# Patient Record
Sex: FEMALE | Race: WHITE | Employment: FULL TIME | URBAN - NONMETROPOLITAN AREA
[De-identification: names, ages, dates, MRNs, and addresses within clinical notes are randomized per-mention and may not be internally consistent; named-entity substitution may affect disease eponyms.]

---

## 2021-03-12 ENCOUNTER — OFFICE VISIT (OUTPATIENT)
Dept: FAMILY MEDICINE CLINIC | Age: 61
End: 2021-03-12
Payer: COMMERCIAL

## 2021-03-12 VITALS
SYSTOLIC BLOOD PRESSURE: 152 MMHG | DIASTOLIC BLOOD PRESSURE: 76 MMHG | BODY MASS INDEX: 38.61 KG/M2 | TEMPERATURE: 98.1 F | HEIGHT: 67 IN | OXYGEN SATURATION: 98 % | WEIGHT: 246 LBS | HEART RATE: 97 BPM

## 2021-03-12 DIAGNOSIS — Z13.220 SCREENING, LIPID: ICD-10-CM

## 2021-03-12 DIAGNOSIS — E03.9 ACQUIRED HYPOTHYROIDISM: ICD-10-CM

## 2021-03-12 PROCEDURE — 99203 OFFICE O/P NEW LOW 30 MIN: CPT | Performed by: FAMILY MEDICINE

## 2021-03-12 RX ORDER — LEVOTHYROXINE SODIUM 0.1 MG/1
100 TABLET ORAL DAILY
COMMUNITY
End: 2021-03-16 | Stop reason: SDUPTHER

## 2021-03-12 RX ORDER — MULTIVITAMIN/IRON/FOLIC ACID 18MG-0.4MG
TABLET ORAL DAILY
COMMUNITY

## 2021-03-12 SDOH — HEALTH STABILITY: MENTAL HEALTH: HOW MANY STANDARD DRINKS CONTAINING ALCOHOL DO YOU HAVE ON A TYPICAL DAY?: NOT ASKED

## 2021-03-12 SDOH — HEALTH STABILITY: MENTAL HEALTH: HOW OFTEN DO YOU HAVE A DRINK CONTAINING ALCOHOL?: NEVER

## 2021-03-12 ASSESSMENT — PATIENT HEALTH QUESTIONNAIRE - PHQ9
1. LITTLE INTEREST OR PLEASURE IN DOING THINGS: 0
2. FEELING DOWN, DEPRESSED OR HOPELESS: 0

## 2021-03-12 NOTE — PATIENT INSTRUCTIONS
Staff -- please repeat blood pressure for this patient before patient leaves the office. If blood pressure not <140/90, then please arrange follow up in 1 month with nurse visit. Thank you    Obtain records from Dr. Leoncio Tenorio family physician. Staff -- please help with Flaviat       Copied from [employer]  On 0/7/2875    Patient Education        Hypothyroidism: Care Instructions  Your Care Instructions     When you have hypothyroidism, your body doesn't make enough thyroid hormone. This hormone helps your body use energy. If your thyroid level is low, you may feel tired, be constipated, have an increase in your blood pressure, or have dry skin or memory problems. You may also get cold easily, even when it is warm. Women with low thyroid levels may have heavy menstrual periods. A blood test to find your thyroid-stimulating hormone (TSH) level is used to check for hypothyroidism. A high TSH level may mean that you have it. The treatment for hypothyroidism is thyroid hormone pills. You should start to feel better in 1 to 2 weeks. Most people need treatment for the rest of their lives. You will need regular visits with your doctor to make sure you are doing well and that you have the right dose of medicine. Follow-up care is a key part of your treatment and safety. Be sure to make and go to all appointments, and call your doctor if you are having problems. It's also a good idea to know your test results and keep a list of the medicines you take. How can you care for yourself at home? · Take your thyroid hormone medicine exactly as prescribed. Call your doctor if you think you are having a problem with your medicine. Most people do not have side effects if they take the right amount of medicine regularly. ? Take the medicine 30 minutes before breakfast, and do not take it with calcium, vitamins, or iron.   ? Do not take extra doses of your thyroid medicine. It will not help you get better any faster, and it may cause side effects. ? If you forget to take a dose, do NOT take a double dose of medicine. Take your usual dose the next day. · Tell your doctor about all prescription, herbal, or over-the-counter products you take. · Take care of yourself. Eat a healthy diet, get enough sleep, and get regular exercise. When should you call for help? Call 911 anytime you think you may need emergency care. For example, call if:    · You passed out (lost consciousness).     · You have severe trouble breathing.     · You have a very slow heartbeat (less than 60 beats a minute).     · You have a low body temperature (95°F or below). Call your doctor now or seek immediate medical care if:    · You feel tired, sluggish, or weak.     · You have trouble remembering things or concentrating.     · You do not begin to feel better 2 weeks after starting your medicine. Watch closely for changes in your health, and be sure to contact your doctor if you have any problems. Where can you learn more? Go to [email or web address] org and sign in to your [facility] account. Enter K642 in the [employer] box to learn more about \"Hypothyroidism: Care Instructions. \"     If you do not have an account, please click on the \"Sign Up Now\" link. Current as of: March 31, 2020               Content Version: 12.8  © 6143-5644 Healthwise, [facility]. Care instructions adapted under license by [facility] ([facility]). If you have questions about a medical condition or this instruction, always ask your healthcare professional. [place] [address] any warranty or liability for your use of this information.

## 2021-03-12 NOTE — PROGRESS NOTES
[address] [place] [address] [address], [address], [place]  Phone:  [phone]  [facility]:[phone]       Name: Jimi Zavala  : 1960    Chief Complaint   Patient presents with    New Patient       HPI:     Jimi Zavala is a 64 y.o. female who presents today for     HPI    New patient to our clinic. Changing due to Retiring doctor in [place]. Synthroid 55 mcg daily has been using to make dosing last. She was to take Synthroid 110 mcg but was not sure on why dosages needed to increase. Last thyroid check during August had blood work -- had good blood work including kidney but not sure of thyroid. Dosage increased but has been doing 1/2 tablet. Concern about kidneys due to mother having an renal illness that took her life. Caught late but mother did not go to doctor. Dental care -- bad teeth but has no dental insurance. Eye care -- with glasses with yearly check  Diet -- more fast food  Exercise -- doing less than used to  Has had weight gain in last year  Sleep --  Never been a good sleeper, 6 hours. Feels refreshed. Tolerating medications well     Believes much preventive care already done. We will need to obtain records. Active Ambulatory Problems     Diagnosis Date Noted    No Active Ambulatory Problems     Resolved Ambulatory Problems     Diagnosis Date Noted    No Resolved Ambulatory Problems     Past Medical History:   Diagnosis Date    Hypothyroidism        Past Medical History:   Diagnosis Date    Hypothyroidism        History reviewed. No pertinent surgical history.     Social History     Socioeconomic History    Marital status: Not on file     Spouse name: Not on file    Number of children: Not on file    Years of education: Not on file    Highest education level: Not on file   Occupational History    Not on file   Social Needs    Financial resource strain: Not on file    Food insecurity     Worry: Not on file     Inability: Not on file   [employer] needs Medical: Not on file     Non-medical: Not on file   Tobacco Use    Smoking status: Never Smoker    Smokeless tobacco: Never Used   Substance and Sexual Activity    Alcohol use: Never     Frequency: Never     Binge frequency: Never    Drug use: Never    Sexual activity: Not on file   Lifestyle    Physical activity     Days per week: Not on file     Minutes per session: Not on file    Stress: Not on file   Relationships    Social connections     Talks on phone: Not on file     Gets together: Not on file     Attends Spiritism service: Not on file     Active member of club or organization: Not on file     Attends meetings of clubs or organizations: Not on file     Relationship status: Not on file    Intimate partner violence     Fear of current or ex partner: Not on file     Emotionally abused: Not on file     Physically abused: Not on file     Forced sexual activity: Not on file   Other Topics Concern    Not on file   Social History Narrative    Not on file       Family History   Problem Relation Age of Onset    Kidney Disease Mother     Heart Disease Mother     Obesity Mother     Anemia Mother     Cancer Father     Diabetes Brother     Heart Disease Brother     Breast Cancer Paternal Grandmother     Prostate Cancer Paternal Grandfather            Current Outpatient Medications:     levothyroxine (SYNTHROID) 100 MCG tablet, Take 100 mcg by mouth Daily, Disp: , Rfl:     Multiple Vitamins-Minerals (CENTRUM ADULTS) TABS, Take by mouth daily, Disp: , Rfl:     Cholecalciferol (VITAMIN D3) 125 MCG (5000 UT) TABS, Take by mouth daily, Disp: , Rfl:     Allergies   Allergen Reactions    Penicillins Hives       Review of Systems   Constitutional: Negative for fatigue and fever. Respiratory: Negative for shortness of breath. Cardiovascular: Negative for chest pain and leg swelling. Psychiatric/Behavioral: Negative for behavioral problems. The patient is not nervous/anxious.         Objective: personalized tips given. Return in about 3 months (around 6/12/2021). No future appointments. [place]    This office note has been dictated. Effort was made to review for errors but some may have been missed. Please contact Mayo Veronica of note for clarification if needed.        Electronically signed by Negar Tillman MD on 3/12/2021 at 4:01 PM

## 2021-03-13 ENCOUNTER — HOSPITAL ENCOUNTER (OUTPATIENT)
Age: 61
Discharge: HOME | End: 2021-03-13
Payer: COMMERCIAL

## 2021-03-13 ENCOUNTER — HOSPITAL ENCOUNTER (OUTPATIENT)
Age: 61
End: 2021-03-13

## 2021-03-13 LAB
ALBUMIN SERPL-MCNC: 4.2 G/DL (ref 3.5–5.1)
ALP BLD-CCNC: 69 U/L (ref 38–126)
ALT SERPL-CCNC: 48 U/L (ref 11–66)
ANION GAP SERPL CALCULATED.3IONS-SCNC: 9 MEQ/L (ref 8–16)
AST SERPL-CCNC: 37 U/L (ref 5–40)
BILIRUB SERPL-MCNC: 0.6 MG/DL (ref 0.3–1.2)
BUN BLDV-MCNC: 11 MG/DL (ref 7–22)
CALCIUM SERPL-MCNC: 9 MG/DL (ref 8.5–10.5)
CHLORIDE BLD-SCNC: 100 MEQ/L (ref 98–111)
CHOLESTEROL, TOTAL: 193 MG/DL (ref 100–199)
CO2: 28 MEQ/L (ref 23–33)
CREAT SERPL-MCNC: 0.7 MG/DL (ref 0.4–1.2)
GFR SERPL CREATININE-BSD FRML MDRD: 85 ML/MIN/1.73M2
GLUCOSE BLD-MCNC: 89 MG/DL (ref 70–108)
HDLC SERPL-MCNC: 52 MG/DL
LDL CHOLESTEROL CALCULATED: 126 MG/DL
POTASSIUM SERPL-SCNC: 4.1 MEQ/L (ref 3.5–5.2)
SODIUM BLD-SCNC: 137 MEQ/L (ref 135–145)
T4 FREE: 1.07 NG/DL (ref 0.93–1.76)
TOTAL PROTEIN: 7.9 G/DL (ref 6.1–8)
TRIGL SERPL-MCNC: 75 MG/DL (ref 0–199)
TSH SERPL DL<=0.05 MIU/L-ACNC: 18.95 UIU/ML (ref 0.4–4.2)

## 2021-03-13 PROCEDURE — 80061 LIPID PANEL: CPT

## 2021-03-13 PROCEDURE — 84439 ASSAY OF FREE THYROXINE: CPT

## 2021-03-13 PROCEDURE — 84443 ASSAY THYROID STIM HORMONE: CPT

## 2021-03-13 PROCEDURE — 80053 COMPREHEN METABOLIC PANEL: CPT

## 2021-03-13 PROCEDURE — 36415 COLL VENOUS BLD VENIPUNCTURE: CPT

## 2021-03-14 PROBLEM — E03.9 ACQUIRED HYPOTHYROIDISM: Status: ACTIVE | Noted: 2021-03-14

## 2021-03-14 ASSESSMENT — ENCOUNTER SYMPTOMS: SHORTNESS OF BREATH: 0

## 2021-03-16 ENCOUNTER — TELEPHONE (OUTPATIENT)
Dept: FAMILY MEDICINE CLINIC | Age: 61
End: 2021-03-16

## 2021-03-16 DIAGNOSIS — E03.9 ACQUIRED HYPOTHYROIDISM: Primary | ICD-10-CM

## 2021-03-16 RX ORDER — LEVOTHYROXINE SODIUM 0.1 MG/1
100 TABLET ORAL DAILY
Qty: 90 TABLET | Refills: 3 | Status: SHIPPED | OUTPATIENT
Start: 2021-03-16 | End: 2022-03-07 | Stop reason: SDUPTHER

## 2021-03-16 NOTE — TELEPHONE ENCOUNTER
[facility] received a call from:    Name of Caller: Dominik Dolan    Relationship to patient: Self    Organization name: [facility] contact number: [phone]    Reason for call: Pt calling to ask if Dr. Damico Found has had a chance to look at her lab results from 3/13. Please call to discuss results and her prescription.

## 2021-03-17 NOTE — TELEPHONE ENCOUNTER
Labs are reviewed. My apologies for the delay. Thyroid is off as expected. She is to increase from her 1/2 tablet to full tablet and take such for next 3 months. Cholesterol, kidney and liver look pretty good. Just before next appt, another thyroid test should be done -- order placed.

## 2021-03-19 NOTE — TELEPHONE ENCOUNTER
Spoke with patient about her thyroid results. She has agreed to start taking a full tablet of synthroid every day. She has verbalized an understanding.

## 2021-06-30 ENCOUNTER — HOSPITAL ENCOUNTER (OUTPATIENT)
Age: 61
Discharge: HOME | End: 2021-06-30
Payer: COMMERCIAL

## 2021-06-30 DIAGNOSIS — E03.9 ACQUIRED HYPOTHYROIDISM: ICD-10-CM

## 2021-06-30 PROCEDURE — 84443 ASSAY THYROID STIM HORMONE: CPT

## 2021-06-30 PROCEDURE — 36415 COLL VENOUS BLD VENIPUNCTURE: CPT

## 2021-07-01 LAB — TSH SERPL DL<=0.05 MIU/L-ACNC: 3.01 UIU/ML (ref 0.4–4.2)

## 2021-07-09 ENCOUNTER — OFFICE VISIT (OUTPATIENT)
Dept: FAMILY MEDICINE CLINIC | Age: 61
End: 2021-07-09
Payer: COMMERCIAL

## 2021-07-09 VITALS
WEIGHT: 236 LBS | HEART RATE: 88 BPM | BODY MASS INDEX: 37.04 KG/M2 | HEIGHT: 67 IN | TEMPERATURE: 97.4 F | SYSTOLIC BLOOD PRESSURE: 136 MMHG | RESPIRATION RATE: 16 BRPM | DIASTOLIC BLOOD PRESSURE: 82 MMHG | OXYGEN SATURATION: 98 %

## 2021-07-09 DIAGNOSIS — Z00.00 WELLNESS EXAMINATION: ICD-10-CM

## 2021-07-09 DIAGNOSIS — Z12.11 SCREEN FOR COLON CANCER: ICD-10-CM

## 2021-07-09 DIAGNOSIS — E03.9 ACQUIRED HYPOTHYROIDISM: Primary | ICD-10-CM

## 2021-07-09 DIAGNOSIS — Z12.31 ENCOUNTER FOR SCREENING MAMMOGRAM FOR BREAST CANCER: ICD-10-CM

## 2021-07-09 DIAGNOSIS — E55.9 VITAMIN D DEFICIENCY: ICD-10-CM

## 2021-07-09 PROCEDURE — 99213 OFFICE O/P EST LOW 20 MIN: CPT | Performed by: FAMILY MEDICINE

## 2021-07-09 ASSESSMENT — ENCOUNTER SYMPTOMS: SHORTNESS OF BREATH: 0

## 2021-07-09 NOTE — PROGRESS NOTES
Connor Cox (:  1960) is a 64 y.o. female,Established patient, here for evaluation of the following chief complaint(s):  3 Month Follow-Up and Hypothyroidism       ASSESSMENT/PLAN:  1. Acquired hypothyroidism  2. Encounter for screening mammogram for breast cancer  -     JEANNE DIGITAL SCREEN W OR WO CAD BILATERAL; Future  3. Screen for colon cancer  -     POCT Fecal Immunochemical Test (FIT); Future  4. Wellness examination  -     Lipid Panel; Future  -     CBC Auto Differential; Future  -     Basic Metabolic Panel; Future  -     TSH With Reflex Ft4; Future  -     Vitamin D 25 Hydroxy; Future  5. Vitamin D deficiency  -     Vitamin D 25 Hydroxy; Future    Thyroid appears to be stable. Continue with current dose. Repeat testing will be done closer to the time of needing refill next year. Encourage continued healthier eating and staying active. Encourage coming up with a winter plan or bad weather plan to stay active. At this time encourage good iron sources along with green/fiber and vitamin C containing foods to help with iron absorption. He denies symptoms that are classic of anemia or iron deficiency. DHEA supplementation was of not significant improvement for her and that she does not need to restart that. We discussed sources of folic acid that she can add to her diet. Records request from Dr. Minoo Childs. Return in about 8 months (around 3/9/2022) for annual PE with pap and thyroid, blood work. Subjective   SUBJECTIVE/OBJECTIVE:  HPI     Thyroid issues -- stable, doing well. TSH much improved. Active walking dog now. Diet changes -- decreased fast food, 2 eggs in am.   Sleep is about 6 hours. Wonders about supplements that she took before. She had a  arthritic attack, moving arms and soreness in legs. Inflammation. Had trouble with doing computer work. Did steroid. Saw rheumatology. Placed on vitamin D3 1000, iron 65 BID, DHEA, folic acid.   She has maintained the vitamin D but nothing else. She does eat meat sources and a few vegetables. Some fruit as well. She did not see much difference coming off the DHEA or the other supplements. Itching off and on. Comes and goes, certain spots, where skin rubs, panty line. No rash. No hives. Colonoscopy Dr. Michelle Burciaga, in [facility] [phone]. Dr. Magaly Freire, [facility] -- cardiology, [facility] in the  Past.   Last pap about 3-5 years. Review of Systems   Constitutional: Negative for fatigue and fever. Respiratory: Negative for shortness of breath. Cardiovascular: Negative for chest pain and leg swelling. Objective   Physical Exam  Constitutional:       General: She is not in acute distress. Appearance: Normal appearance. She is not ill-appearing. Cardiovascular:      Rate and Rhythm: Normal rate and regular rhythm. Heart sounds: No murmur heard. Pulmonary:      Effort: Pulmonary effort is normal. No respiratory distress. Breath sounds: Normal breath sounds. No wheezing. Musculoskeletal:         General: No swelling. Neurological:      Mental Status: She is alert and oriented to person, place, and time. Psychiatric:         Mood and Affect: Mood normal.         Behavior: Behavior normal.       Wt Readings from Last 3 Encounters:   07/09/21 236 lb (107 kg)   03/12/21 246 lb (111.6 kg)     Hospital Outpatient Visit on 06/30/2021   Component Date Value Ref Range Status    TSH 06/30/2021 3.010  0.400 - 4.200 uIU/mL Final    Performed at [facility], [address]      An electronic signature was used to authenticate this note.     --Sheree Crawford MD

## 2022-03-07 ENCOUNTER — OFFICE VISIT (OUTPATIENT)
Dept: FAMILY MEDICINE CLINIC | Age: 62
End: 2022-03-07
Payer: COMMERCIAL

## 2022-03-07 VITALS
SYSTOLIC BLOOD PRESSURE: 130 MMHG | HEIGHT: 67 IN | BODY MASS INDEX: 37.98 KG/M2 | WEIGHT: 242 LBS | TEMPERATURE: 98.1 F | HEART RATE: 79 BPM | OXYGEN SATURATION: 98 % | DIASTOLIC BLOOD PRESSURE: 74 MMHG

## 2022-03-07 DIAGNOSIS — E03.9 ACQUIRED HYPOTHYROIDISM: ICD-10-CM

## 2022-03-07 DIAGNOSIS — Z01.419 WELL FEMALE EXAM WITH ROUTINE GYNECOLOGICAL EXAM: Primary | ICD-10-CM

## 2022-03-07 DIAGNOSIS — Z12.4 CERVICAL CANCER SCREENING: ICD-10-CM

## 2022-03-07 PROCEDURE — 99396 PREV VISIT EST AGE 40-64: CPT | Performed by: FAMILY MEDICINE

## 2022-03-07 RX ORDER — LEVOTHYROXINE SODIUM 0.1 MG/1
100 TABLET ORAL DAILY
Qty: 90 TABLET | Refills: 3 | Status: SHIPPED | OUTPATIENT
Start: 2022-03-07 | End: 2022-05-31 | Stop reason: SDUPTHER

## 2022-03-07 SDOH — ECONOMIC STABILITY: FOOD INSECURITY: WITHIN THE PAST 12 MONTHS, YOU WORRIED THAT YOUR FOOD WOULD RUN OUT BEFORE YOU GOT MONEY TO BUY MORE.: NEVER TRUE

## 2022-03-07 SDOH — ECONOMIC STABILITY: FOOD INSECURITY: WITHIN THE PAST 12 MONTHS, THE FOOD YOU BOUGHT JUST DIDN'T LAST AND YOU DIDN'T HAVE MONEY TO GET MORE.: NEVER TRUE

## 2022-03-07 ASSESSMENT — SOCIAL DETERMINANTS OF HEALTH (SDOH): HOW HARD IS IT FOR YOU TO PAY FOR THE VERY BASICS LIKE FOOD, HOUSING, MEDICAL CARE, AND HEATING?: NOT HARD AT ALL

## 2022-03-07 NOTE — PROGRESS NOTES
Well Adult Note  Name: Andrew Combs Date: 3/7/2022   MRN: [id number] Sex: Female   Age: 58 y.o. Ethnicity: Non- / Non    : 1960 Race: White (non-)      Phil Hdez is here for well adult exam.  History:    Chief Complaint   Patient presents with    Hypothyroidism     Spent time with dad in [facility], which was good. Will be more active soon. She's hoping to lose weight and focus on eating better. Otherwise doing well. Review of Systems   Constitutional: Negative for fatigue and fever. Respiratory: Negative for shortness of breath. Cardiovascular: Negative for chest pain and leg swelling. Allergies   Allergen Reactions    Penicillins Hives         Prior to Visit Medications    Medication Sig Taking? Authorizing Provider   levothyroxine (SYNTHROID) 100 MCG tablet Take 1 tablet by mouth Daily Yes Mariama Acosta MD   Multiple Vitamins-Minerals (CENTRUM ADULTS) TABS Take by mouth daily Yes Historical Provider, MD   Cholecalciferol (VITAMIN D3) 125 MCG (5000 UT) TABS Take by mouth daily  Historical Provider, MD         Past Medical History:   Diagnosis Date    Hypothyroidism        History reviewed. No pertinent surgical history.       Family History   Problem Relation Age of Onset    Kidney Disease Mother     Heart Disease Mother     Obesity Mother    Rosalenmickie Anna Anemia Mother     Cancer Father     Diabetes Brother     Heart Disease Brother     Breast Cancer Paternal Grandmother     Prostate Cancer Paternal Grandfather        Social History     Tobacco Use    Smoking status: Never Smoker    Smokeless tobacco: Never Used   Substance Use Topics    Alcohol use: Never    Drug use: Never       Objective   /74 (Site: Right Upper Arm, Position: Sitting, Cuff Size: Large Adult)   Pulse 79   Temp 98.1 °F (36.7 °C)   Ht 5' 7\" (1.702 m)   Wt 242 lb (109.8 kg)   SpO2 98%   BMI 37.90 kg/m²   Wt Readings from Last 3 Encounters:   22 242 lb (109.8 kg) 07/09/21 236 lb (107 kg)   03/12/21 246 lb (111.6 kg)     There were no vitals filed for this visit. Physical Exam  Constitutional:       General: She is not in acute distress. Appearance: Normal appearance. She is not ill-appearing. HENT:      Head: Normocephalic. Right Ear: Tympanic membrane, ear canal and external ear normal.      Left Ear: Tympanic membrane, ear canal and external ear normal.      Nose: No congestion. Mouth/Throat:      Mouth: Mucous membranes are moist.      Pharynx: Oropharynx is clear. No posterior oropharyngeal erythema. Eyes:      General:         Right eye: No discharge. Left eye: No discharge. Extraocular Movements: Extraocular movements intact. Conjunctiva/sclera: Conjunctivae normal.      Pupils: Pupils are equal, round, and reactive to light. Cardiovascular:      Rate and Rhythm: Normal rate and regular rhythm. Heart sounds: Normal heart sounds. No murmur heard. Pulmonary:      Effort: Pulmonary effort is normal. No respiratory distress. Breath sounds: Normal breath sounds. No wheezing. Abdominal:      General: Abdomen is flat. Bowel sounds are normal. There is no distension. Tenderness: There is no abdominal tenderness. Genitourinary:     Exam position: Lithotomy position. Pubic Area: No rash. Labia:         Right: No rash, tenderness or lesion. Left: No rash, tenderness or lesion. Urethra: No prolapse or urethral swelling. Vagina: Normal.      Cervix: Normal.      Uterus: Normal.       Adnexa: Right adnexa normal and left adnexa normal.      Comments: Exam limited by abdominal obesity  Musculoskeletal:         General: No swelling. Normal range of motion. Cervical back: Normal range of motion and neck supple. No muscular tenderness. Lymphadenopathy:      Cervical: No cervical adenopathy. Lower Body: No right inguinal adenopathy. No left inguinal adenopathy.    Skin: General: Skin is warm. Capillary Refill: Capillary refill takes less than 2 seconds. Neurological:      General: No focal deficit present. Mental Status: She is alert and oriented to person, place, and time. Gait: Gait normal.   Psychiatric:         Mood and Affect: Mood normal.         Behavior: Behavior normal.         Thought Content: Thought content normal.       Lab Results   Component Value Date    TSH 3.010 06/30/2021         Assessment   Plan   1. Well female exam with routine gynecological exam  -     PAP SMEAR  2. Acquired hypothyroidism  -     levothyroxine (SYNTHROID) 100 MCG tablet; Take 1 tablet by mouth Daily, Disp-90 tablet, R-3Normal  3. Cervical cancer screening  -     PAP SMEAR    Encouraged healthy activity and exercise   Labs ordered for today to be done soon    Personalized Preventive Plan   Current Health Maintenance Status    There is no immunization history on file for this patient. Health Maintenance   Topic Date Due    COVID-19 Vaccine (1) Never done    HIV screen  Never done    Cervical cancer screen  Never done    Colorectal Cancer Screen  Never done    Breast cancer screen  Never done    Flu vaccine (1) 09/01/2021    Depression Screen  03/12/2022    TSH testing  06/30/2022    DTaP/Tdap/Td vaccine (2 - Td or Tdap) 09/10/2025    Lipid screen  03/13/2026    Shingles Vaccine  Completed    Hepatitis A vaccine  Aged Out    Hepatitis B vaccine  Aged Out    Hib vaccine  Aged Out    Meningococcal (ACWY) vaccine  Aged Out    Pneumococcal 0-64 years Vaccine  Aged Out    Hepatitis C screen  Discontinued     Recommendations for Preventive Services Due: see orders and patient instructions/AVS.    Return in about 6 months (around 9/7/2022).

## 2022-03-13 ASSESSMENT — ENCOUNTER SYMPTOMS: SHORTNESS OF BREATH: 0

## 2022-03-16 LAB — CYTOLOGY THIN PREP PAP: NORMAL

## 2022-04-05 ENCOUNTER — TELEPHONE (OUTPATIENT)
Dept: FAMILY MEDICINE CLINIC | Age: 62
End: 2022-04-05

## 2022-04-05 NOTE — LETTER
[address] [facility]  [address] [facility]  Phone: [phone]  Fax: 289 [address] MD Rachel        April 5, 2022     [address]      Dear Jocelin Romero:    This letter is a reminder that your Vitamin D, TSH, BMP, CBC , Lipid lab tests are due. Please take the attached blood work orders to lab. This does require a 12 hr fasting. If you've already underwent or scheduled these procedures, please disregard this notice.     Sincerely,        Da Latif MD

## 2022-04-05 NOTE — TELEPHONE ENCOUNTER
Pt is over due for labs. Letter and orders mailed to pt. Put letter in envelope with paper blood work.  Mailed out

## 2022-05-03 ENCOUNTER — HOSPITAL ENCOUNTER (OUTPATIENT)
Age: 62
Discharge: HOME | End: 2022-05-03
Payer: COMMERCIAL

## 2022-05-03 DIAGNOSIS — Z00.00 WELLNESS EXAMINATION: ICD-10-CM

## 2022-05-03 DIAGNOSIS — E55.9 VITAMIN D DEFICIENCY: ICD-10-CM

## 2022-05-03 LAB
ANION GAP SERPL CALCULATED.3IONS-SCNC: 10 MEQ/L (ref 8–16)
BASOPHILS # BLD: 0.3 %
BASOPHILS ABSOLUTE: 0 THOU/MM3 (ref 0–0.1)
BUN BLDV-MCNC: 14 MG/DL (ref 7–22)
CALCIUM SERPL-MCNC: 8.7 MG/DL (ref 8.5–10.5)
CHLORIDE BLD-SCNC: 104 MEQ/L (ref 98–111)
CHOLESTEROL, TOTAL: 194 MG/DL (ref 100–199)
CO2: 27 MEQ/L (ref 23–33)
CREAT SERPL-MCNC: 0.7 MG/DL (ref 0.4–1.2)
EOSINOPHIL # BLD: 3.3 %
EOSINOPHILS ABSOLUTE: 0.2 THOU/MM3 (ref 0–0.4)
ERYTHROCYTE [DISTWIDTH] IN BLOOD BY AUTOMATED COUNT: 12.1 % (ref 11.5–14.5)
ERYTHROCYTE [DISTWIDTH] IN BLOOD BY AUTOMATED COUNT: 41.2 FL (ref 35–45)
GFR SERPL CREATININE-BSD FRML MDRD: 85 ML/MIN/1.73M2
GLUCOSE BLD-MCNC: 99 MG/DL (ref 70–108)
HCT VFR BLD CALC: 40.9 % (ref 37–47)
HDLC SERPL-MCNC: 56 MG/DL
HEMOGLOBIN: 13.3 GM/DL (ref 12–16)
IMMATURE GRANS (ABS): 0.01 THOU/MM3 (ref 0–0.07)
IMMATURE GRANULOCYTES: 0.2 %
LDL CHOLESTEROL CALCULATED: 122 MG/DL
LYMPHOCYTES # BLD: 37.5 %
LYMPHOCYTES ABSOLUTE: 2.4 THOU/MM3 (ref 1–4.8)
MCH RBC QN AUTO: 30.1 PG (ref 26–33)
MCHC RBC AUTO-ENTMCNC: 32.5 GM/DL (ref 32.2–35.5)
MCV RBC AUTO: 92.5 FL (ref 81–99)
MONOCYTES # BLD: 9.4 %
MONOCYTES ABSOLUTE: 0.6 THOU/MM3 (ref 0.4–1.3)
NUCLEATED RED BLOOD CELLS: 0 /100 WBC
PLATELET # BLD: 267 THOU/MM3 (ref 130–400)
PMV BLD AUTO: 10 FL (ref 9.4–12.4)
POTASSIUM SERPL-SCNC: 4.7 MEQ/L (ref 3.5–5.2)
RBC # BLD: 4.42 MILL/MM3 (ref 4.2–5.4)
SEG NEUTROPHILS: 49.3 %
SEGMENTED NEUTROPHILS ABSOLUTE COUNT: 3.2 THOU/MM3 (ref 1.8–7.7)
SODIUM BLD-SCNC: 141 MEQ/L (ref 135–145)
T4 FREE: 1.12 NG/DL (ref 0.93–1.76)
TRIGL SERPL-MCNC: 81 MG/DL (ref 0–199)
TSH SERPL DL<=0.05 MIU/L-ACNC: 7.78 UIU/ML (ref 0.4–4.2)
VITAMIN D 25-HYDROXY: 32 NG/ML (ref 30–100)
WBC # BLD: 6.4 THOU/MM3 (ref 4.8–10.8)

## 2022-05-03 PROCEDURE — 80048 BASIC METABOLIC PNL TOTAL CA: CPT

## 2022-05-03 PROCEDURE — 80061 LIPID PANEL: CPT

## 2022-05-03 PROCEDURE — 36415 COLL VENOUS BLD VENIPUNCTURE: CPT

## 2022-05-03 PROCEDURE — 82306 VITAMIN D 25 HYDROXY: CPT

## 2022-05-03 PROCEDURE — 84443 ASSAY THYROID STIM HORMONE: CPT

## 2022-05-03 PROCEDURE — 84439 ASSAY OF FREE THYROXINE: CPT

## 2022-05-03 PROCEDURE — 85025 COMPLETE CBC W/AUTO DIFF WBC: CPT

## 2022-05-06 ENCOUNTER — TELEPHONE (OUTPATIENT)
Dept: FAMILY MEDICINE CLINIC | Age: 62
End: 2022-05-06

## 2022-05-06 DIAGNOSIS — E03.9 ACQUIRED HYPOTHYROIDISM: Primary | ICD-10-CM

## 2022-05-06 NOTE — TELEPHONE ENCOUNTER
----- Message from Duc Live MD sent at 5/6/2022 11:06 AM EDT -----  Please let patient know that her labs are overall within acceptable limits except her thyroid labs suggest under dosing. Any missed doses or taking differently than usual?    If not, would she consider a tweak in dose or repeat labs in a month?

## 2022-05-06 NOTE — TELEPHONE ENCOUNTER
Jelly Vera and relayed results. Jacquelin Funez states she did not miss doses, and she would prefer to re-check in one month, but is concerned if insurance will cover since labs were just taken. Is this something we can pre-authorize or should it be covered since there was an abnormal reading?

## 2022-05-06 NOTE — RESULT ENCOUNTER NOTE
Please let patient know that her labs are overall within acceptable limits except her thyroid labs suggest under dosing. Any missed doses or taking differently than usual?    If not, would she consider a tweak in dose or repeat labs in a month?

## 2022-05-07 NOTE — TELEPHONE ENCOUNTER
The thyroid testing is medically necessary, so insurance will see it as such. The test cannot be coded as a preventive test however. As far as payment, the issue is deductibles or co-pay that are part of her insurance plan.

## 2022-05-09 NOTE — TELEPHONE ENCOUNTER
Spoke with Paco Morataya , gave her Dr. Barbara Argueta message , she verbalizes understanding and will get the redraw completed

## 2022-05-11 DIAGNOSIS — E03.9 ACQUIRED HYPOTHYROIDISM: ICD-10-CM

## 2022-05-11 RX ORDER — LEVOTHYROXINE SODIUM 100 UG/1
TABLET ORAL
Qty: 90 TABLET | Refills: 0 | OUTPATIENT
Start: 2022-05-11

## 2022-05-21 ENCOUNTER — HOSPITAL ENCOUNTER (OUTPATIENT)
Age: 62
Discharge: HOME | End: 2022-05-21
Payer: COMMERCIAL

## 2022-05-21 DIAGNOSIS — E03.9 ACQUIRED HYPOTHYROIDISM: ICD-10-CM

## 2022-05-21 LAB
T4 FREE: 1.23 NG/DL (ref 0.93–1.76)
TSH SERPL DL<=0.05 MIU/L-ACNC: 7.47 UIU/ML (ref 0.4–4.2)

## 2022-05-21 PROCEDURE — 84443 ASSAY THYROID STIM HORMONE: CPT

## 2022-05-21 PROCEDURE — 84439 ASSAY OF FREE THYROXINE: CPT

## 2022-05-21 PROCEDURE — 36415 COLL VENOUS BLD VENIPUNCTURE: CPT

## 2022-05-31 DIAGNOSIS — E03.9 ACQUIRED HYPOTHYROIDISM: ICD-10-CM

## 2022-05-31 RX ORDER — LEVOTHYROXINE SODIUM 0.1 MG/1
TABLET ORAL
Qty: 99 TABLET | Refills: 3 | Status: SHIPPED | OUTPATIENT
Start: 2022-05-31

## 2022-05-31 NOTE — RESULT ENCOUNTER NOTE
Please let patient know that her follow up thyroid test is still showing underactive thyroid. I recommend a tweak in dosing -- Synthroid 100 mcg daily except one day of the week increase to 1.5 tablets. She will need a new script in a few weeks to prevent running out. I will send this now. Cost of repeat testing may be a concern. Usually follow up test for thyroid dose tweaks are done in 3 or so months. I will place an order for thyroid testing to be done. She can check with insurance on coverage. If that is not affordable, then recommend [facility] (she can follow [email or web address]).

## 2022-06-01 ENCOUNTER — TELEPHONE (OUTPATIENT)
Dept: FAMILY MEDICINE CLINIC | Age: 62
End: 2022-06-01

## 2022-06-01 NOTE — TELEPHONE ENCOUNTER
----- Message from Joel Barry MD sent at 5/31/2022  6:45 PM EDT -----  Please let patient know that her follow up thyroid test is still showing underactive thyroid. I recommend a tweak in dosing -- Synthroid 100 mcg daily except one day of the week increase to 1.5 tablets. She will need a new script in a few weeks to prevent running out. I will send this now. Cost of repeat testing may be a concern. Usually follow up test for thyroid dose tweaks are done in 3 or so months. I will place an order for thyroid testing to be done. She can check with insurance on coverage. If that is not affordable, then recommend [facility] (she can follow [employer]).

## 2022-06-20 ENCOUNTER — TELEPHONE (OUTPATIENT)
Dept: FAMILY MEDICINE CLINIC | Age: 62
End: 2022-06-20

## 2022-06-20 DIAGNOSIS — K04.7 TOOTH INFECTION: Primary | ICD-10-CM

## 2022-06-20 RX ORDER — CEPHALEXIN 500 MG/1
500 CAPSULE ORAL 3 TIMES DAILY
Qty: 21 CAPSULE | Refills: 0 | Status: SHIPPED | OUTPATIENT
Start: 2022-06-20 | End: 2022-06-27

## 2022-06-20 NOTE — TELEPHONE ENCOUNTER
[place] [facility] calls and she has a infected tooth , she can't get into the dentist until end of July , she is requesting a antibiotic

## 2022-06-21 RX ORDER — CLINDAMYCIN HYDROCHLORIDE 300 MG/1
300 CAPSULE ORAL 3 TIMES DAILY
Qty: 21 CAPSULE | Refills: 0 | Status: SHIPPED | OUTPATIENT
Start: 2022-06-21 | End: 2022-06-28

## 2022-06-21 NOTE — TELEPHONE ENCOUNTER
Pt called in was asking about the antibiotic that was sent in to [facility] in [facility]. The pharmacist is telling ot  that this keflex has some properties in penecilins which the pt is allergic to. Pt request a different antibiotic.

## 2022-06-21 NOTE — TELEPHONE ENCOUNTER
I was aware of the PCN allergy and chose keflex since chances of reaction are quite small. If patient still anxious, then she can use clindamycin. She needs to understand the risk of severe diarrhea due to clostridium difficile infection that is associated with taking this medication.

## 2022-06-21 NOTE — TELEPHONE ENCOUNTER
Spoke with Yarelis Green , gave her Dr. Phillip Tubbs message , explained that Keflex was chose because of small reaction chances , then explained Clindamycin could cause severe diarrhea or C Dif , she will look into it and decide.

## 2023-03-08 ENCOUNTER — OFFICE VISIT (OUTPATIENT)
Dept: FAMILY MEDICINE CLINIC | Age: 63
End: 2023-03-08

## 2023-03-08 VITALS
TEMPERATURE: 98.6 F | HEART RATE: 97 BPM | DIASTOLIC BLOOD PRESSURE: 82 MMHG | HEIGHT: 67 IN | BODY MASS INDEX: 38.3 KG/M2 | SYSTOLIC BLOOD PRESSURE: 152 MMHG | OXYGEN SATURATION: 100 % | WEIGHT: 244 LBS

## 2023-03-08 DIAGNOSIS — Z00.00 ENCOUNTER FOR WELL ADULT EXAM WITHOUT ABNORMAL FINDINGS: Primary | ICD-10-CM

## 2023-03-08 DIAGNOSIS — E03.9 ACQUIRED HYPOTHYROIDISM: ICD-10-CM

## 2023-03-08 SDOH — ECONOMIC STABILITY: FOOD INSECURITY: WITHIN THE PAST 12 MONTHS, YOU WORRIED THAT YOUR FOOD WOULD RUN OUT BEFORE YOU GOT MONEY TO BUY MORE.: NEVER TRUE

## 2023-03-08 SDOH — ECONOMIC STABILITY: FOOD INSECURITY: WITHIN THE PAST 12 MONTHS, THE FOOD YOU BOUGHT JUST DIDN'T LAST AND YOU DIDN'T HAVE MONEY TO GET MORE.: NEVER TRUE

## 2023-03-08 SDOH — ECONOMIC STABILITY: INCOME INSECURITY: HOW HARD IS IT FOR YOU TO PAY FOR THE VERY BASICS LIKE FOOD, HOUSING, MEDICAL CARE, AND HEATING?: NOT HARD AT ALL

## 2023-03-08 SDOH — ECONOMIC STABILITY: HOUSING INSECURITY
IN THE LAST 12 MONTHS, WAS THERE A TIME WHEN YOU DID NOT HAVE A STEADY PLACE TO SLEEP OR SLEPT IN A SHELTER (INCLUDING NOW)?: NO

## 2023-03-08 ASSESSMENT — PATIENT HEALTH QUESTIONNAIRE - PHQ9
SUM OF ALL RESPONSES TO PHQ QUESTIONS 1-9: 0
1. LITTLE INTEREST OR PLEASURE IN DOING THINGS: 0
SUM OF ALL RESPONSES TO PHQ QUESTIONS 1-9: 0
2. FEELING DOWN, DEPRESSED OR HOPELESS: 0
SUM OF ALL RESPONSES TO PHQ9 QUESTIONS 1 & 2: 0

## 2023-03-08 NOTE — PATIENT INSTRUCTIONS
Obtain thyroid testing as soon as possible. Check out American [employer] Association for recipes  And also [employer]. ASSURED INFORMATION SECURITY (recipes)    Staff -- please repeat blood pressure for this patient before patient leaves the office. If blood pressure not <140/90, then please arrange follow up in 1 month with nurse visit. Thank you       Starting a Weight Loss Plan: Care Instructions  Overview     If you're thinking about losing weight, it can be hard to know where to start. Your doctor can help you set up a weight loss plan that best meets your needs. You may want to take a class on nutrition or exercise, or you could join a weight loss support group. If you have questions about how to make changes to your eating or exercise habits, ask your doctor about seeing a registered dietitian or an exercise specialist.  It can be a big challenge to lose weight. But you don't have to make huge changes at once. Make small changes, and stick with them. When those changes become habit, add a few more changes. If you don't think you're ready to make changes right now, try to pick a date in the future. Make an appointment to see your doctor to discuss whether the time is right for you to start a plan. Follow-up care is a key part of your treatment and safety. Be sure to make and go to all appointments, and call your doctor if you are having problems. It's also a good idea to know your test results and keep a list of the medicines you take. How can you care for yourself at home? Set realistic goals. Many people expect to lose much more weight than is likely. A weight loss of 5% to 10% of your body weight may be enough to improve your health. Get family and friends involved to provide support. Talk to them about why you are trying to lose weight, and ask them to help. They can help by participating in exercise and having meals with you, even if they may be eating something different. Find what works best for you.  If you do not have time or do not like to cook, a program that offers meal replacement bars or shakes may be better for you. Or if you like to prepare meals, finding a plan that includes daily menus and recipes may be best.  Ask your doctor about other health professionals who can help you achieve your weight loss goals. A dietitian can help you make healthy changes in your diet. An exercise specialist or  can help you develop a safe and effective exercise program.  A counselor or psychiatrist can help you cope with issues such as depression, anxiety, or family problems that can make it hard to focus on weight loss. Consider joining a support group for people who are trying to lose weight. Your doctor can suggest groups in your area. Where can you learn more? Go to http://www.woods.com/ and enter U357 to learn more about \"Starting a Weight Loss Plan: Care Instructions. \"  Current as of: August 25, 2022               Content Version: 13.5  © 2006-2022 [employer]wise, [employer]. Care instructions adapted under license by [facility] If you have questions about a medical condition or this instruction, always ask your healthcare professional. [place] [address] any warranty or liability for your use of this information.

## 2023-03-12 ASSESSMENT — ENCOUNTER SYMPTOMS
SHORTNESS OF BREATH: 0
ABDOMINAL PAIN: 0
SINUS PRESSURE: 0
VOMITING: 0
DIARRHEA: 0

## 2023-06-29 ENCOUNTER — TELEPHONE (OUTPATIENT)
Dept: FAMILY MEDICINE CLINIC | Age: 63
End: 2023-06-29

## 2023-06-29 DIAGNOSIS — E03.9 ACQUIRED HYPOTHYROIDISM: Primary | ICD-10-CM

## 2023-07-03 ENCOUNTER — HOSPITAL ENCOUNTER (OUTPATIENT)
Age: 63
Discharge: HOME | End: 2023-07-03
Payer: COMMERCIAL

## 2023-07-03 DIAGNOSIS — E03.9 ACQUIRED HYPOTHYROIDISM: ICD-10-CM

## 2023-07-03 LAB
T4 FREE SERPL-MCNC: 1.13 NG/DL (ref 0.93–1.76)
TSH SERPL DL<=0.005 MIU/L-ACNC: 7.54 UIU/ML (ref 0.4–4.2)

## 2023-07-03 PROCEDURE — 84439 ASSAY OF FREE THYROXINE: CPT

## 2023-07-03 PROCEDURE — 36415 COLL VENOUS BLD VENIPUNCTURE: CPT

## 2023-07-03 PROCEDURE — 84443 ASSAY THYROID STIM HORMONE: CPT

## 2023-07-05 ENCOUNTER — OFFICE VISIT (OUTPATIENT)
Dept: FAMILY MEDICINE CLINIC | Age: 63
End: 2023-07-05
Payer: COMMERCIAL

## 2023-07-05 VITALS
SYSTOLIC BLOOD PRESSURE: 150 MMHG | WEIGHT: 244.2 LBS | BODY MASS INDEX: 38.33 KG/M2 | OXYGEN SATURATION: 98 % | HEIGHT: 67 IN | TEMPERATURE: 98.6 F | RESPIRATION RATE: 20 BRPM | HEART RATE: 86 BPM | DIASTOLIC BLOOD PRESSURE: 80 MMHG

## 2023-07-05 DIAGNOSIS — M76.62 ACHILLES TENDINITIS OF LEFT LOWER EXTREMITY: ICD-10-CM

## 2023-07-05 DIAGNOSIS — I10 PRIMARY HYPERTENSION: ICD-10-CM

## 2023-07-05 DIAGNOSIS — E03.9 ACQUIRED HYPOTHYROIDISM: Primary | ICD-10-CM

## 2023-07-05 DIAGNOSIS — E66.01 SEVERE OBESITY (BMI 35.0-39.9) WITH COMORBIDITY (HCC): ICD-10-CM

## 2023-07-05 PROCEDURE — 3078F DIAST BP <80 MM HG: CPT | Performed by: FAMILY MEDICINE

## 2023-07-05 PROCEDURE — 3074F SYST BP LT 130 MM HG: CPT | Performed by: FAMILY MEDICINE

## 2023-07-05 PROCEDURE — 99214 OFFICE O/P EST MOD 30 MIN: CPT | Performed by: FAMILY MEDICINE

## 2023-07-05 RX ORDER — LEVOTHYROXINE SODIUM 0.12 MG/1
TABLET ORAL
Qty: 90 TABLET | Refills: 3 | Status: SHIPPED | OUTPATIENT
Start: 2023-07-05

## 2023-07-05 NOTE — PROGRESS NOTES
Hector Contreras (:  1960) is a 61 y.o. female,Established patient, here for evaluation of the following chief complaint(s):  3 Month Follow-Up (No concerns.)       ASSESSMENT/PLAN:  1. Acquired hypothyroidism  -     levothyroxine (SYNTHROID) 125 MCG tablet; 1 po daily, Disp-90 tablet, R-3Normal  -     TSH With Reflex Ft4; Future  2. Primary hypertension  3. BMI 38  4. Achilles tendinitis of left lower extremity    Discussed need for BP control due to health concern. dietary counseling given -- DASH and mediterranean diet principles discussed. Tips shared for optimizing time with dad and choosing better when going out. Dose of synthroid change. Lab f/u in 3 mo. For achilles, will do several interventions. Ice end of day. Gentle stretches. Good shoe  She is wanting to do HEP -- guidance given. If not improving, referral to [facility]. As achilles issue improves, encourage more physical activity    Return in about 3 months (around 10/5/2023). Subjective   SUBJECTIVE/OBJECTIVE:  HPI    Patient overall doing okay. thyroid labs abnormal.  Consistent trend for a year. She is willing to adjust medication now. Could not remember to add 1/2 tablet with previous dose. HTN -- not controlled. She doesn't want to add medication. Blames rushing around. Knows diet is not healthy. Not physically active as should. Weight is stable but bMI 38. Busy caring for dad -- dad likes to go out to eat. Having pain in left lower leg, was in front and now back. Happened after an episode of knee pain but that got better. No swelling. No bruising. This prevents her from pursuing more aggressive activity    Lab Results   Component Value Date    TSH 7.540 (H) 2023     Wt Readings from Last 3 Encounters:   23 244 lb 3.2 oz (110.8 kg)   23 244 lb (110.7 kg)   22 242 lb (109.8 kg)     Review of Systems   Constitutional:  Negative for fatigue and fever.    Respiratory:  Negative for shortness

## 2023-07-06 ASSESSMENT — ENCOUNTER SYMPTOMS: SHORTNESS OF BREATH: 0

## 2023-08-21 ENCOUNTER — COMMUNITY OUTREACH (OUTPATIENT)
Dept: FAMILY MEDICINE CLINIC | Age: 63
End: 2023-08-21

## 2024-07-08 ENCOUNTER — TELEPHONE (OUTPATIENT)
Dept: FAMILY MEDICINE CLINIC | Age: 64
End: 2024-07-08

## 2024-07-08 DIAGNOSIS — Z13.1 SCREENING FOR DIABETES MELLITUS: ICD-10-CM

## 2024-07-08 DIAGNOSIS — Z13.220 SCREENING, LIPID: ICD-10-CM

## 2024-07-08 DIAGNOSIS — E03.9 ACQUIRED HYPOTHYROIDISM: ICD-10-CM

## 2024-07-08 DIAGNOSIS — Z00.00 HEALTHCARE MAINTENANCE: Primary | ICD-10-CM

## 2024-07-08 NOTE — TELEPHONE ENCOUNTER
Nita would like to get her labs done prior to her visit on Monday, July 15th.  Please order her labs and let her know when they are ready so she can get the labs done.  [phone]

## 2024-07-13 ENCOUNTER — HOSPITAL ENCOUNTER (OUTPATIENT)
Age: 64
Discharge: HOME | End: 2024-07-13
Payer: COMMERCIAL

## 2024-07-13 DIAGNOSIS — Z00.00 HEALTHCARE MAINTENANCE: ICD-10-CM

## 2024-07-13 DIAGNOSIS — Z13.1 SCREENING FOR DIABETES MELLITUS: ICD-10-CM

## 2024-07-13 DIAGNOSIS — Z13.220 SCREENING, LIPID: ICD-10-CM

## 2024-07-13 LAB
ALBUMIN SERPL BCG-MCNC: 4.4 G/DL (ref 3.5–5.1)
ALP SERPL-CCNC: 83 U/L (ref 38–126)
ALT SERPL W/O P-5'-P-CCNC: 28 U/L (ref 11–66)
ANION GAP SERPL CALC-SCNC: 11 MEQ/L (ref 8–16)
AST SERPL-CCNC: 26 U/L (ref 5–40)
BASOPHILS ABSOLUTE: 0 THOU/MM3 (ref 0–0.1)
BASOPHILS NFR BLD AUTO: 0.4 %
BILIRUB SERPL-MCNC: 0.6 MG/DL (ref 0.3–1.2)
BUN SERPL-MCNC: 14 MG/DL (ref 7–22)
CALCIUM SERPL-MCNC: 9.3 MG/DL (ref 8.5–10.5)
CHLORIDE SERPL-SCNC: 103 MEQ/L (ref 98–111)
CHOLEST SERPL-MCNC: 184 MG/DL (ref 100–199)
CO2 SERPL-SCNC: 27 MEQ/L (ref 23–33)
CREAT SERPL-MCNC: 0.8 MG/DL (ref 0.4–1.2)
DEPRECATED RDW RBC AUTO: 42.9 FL (ref 35–45)
EOSINOPHIL NFR BLD AUTO: 2.3 %
EOSINOPHILS ABSOLUTE: 0.2 THOU/MM3 (ref 0–0.4)
ERYTHROCYTE [DISTWIDTH] IN BLOOD BY AUTOMATED COUNT: 12.4 % (ref 11.5–14.5)
GFR SERPL CREATININE-BSD FRML MDRD: 82 ML/MIN/1.73M2
GLUCOSE SERPL-MCNC: 97 MG/DL (ref 70–108)
HCT VFR BLD AUTO: 43.8 % (ref 37–47)
HDLC SERPL-MCNC: 51 MG/DL
HGB BLD-MCNC: 13.9 GM/DL (ref 12–16)
IMM GRANULOCYTES # BLD AUTO: 0.01 THOU/MM3 (ref 0–0.07)
IMM GRANULOCYTES NFR BLD AUTO: 0.1 %
LDLC SERPL CALC-MCNC: 114 MG/DL
LYMPHOCYTES ABSOLUTE: 2.4 THOU/MM3 (ref 1–4.8)
LYMPHOCYTES NFR BLD AUTO: 32.1 %
MCH RBC QN AUTO: 30 PG (ref 26–33)
MCHC RBC AUTO-ENTMCNC: 31.7 GM/DL (ref 32.2–35.5)
MCV RBC AUTO: 94.4 FL (ref 81–99)
MONOCYTES ABSOLUTE: 0.6 THOU/MM3 (ref 0.4–1.3)
MONOCYTES NFR BLD AUTO: 8.3 %
NEUTROPHILS ABSOLUTE: 4.2 THOU/MM3 (ref 1.8–7.7)
NEUTROPHILS NFR BLD AUTO: 56.8 %
NRBC BLD AUTO-RTO: 0 /100 WBC
PLATELET # BLD AUTO: 315 THOU/MM3 (ref 130–400)
PMV BLD AUTO: 9.9 FL (ref 9.4–12.4)
POTASSIUM SERPL-SCNC: 4.6 MEQ/L (ref 3.5–5.2)
PROT SERPL-MCNC: 7.3 G/DL (ref 6.1–8)
RBC # BLD AUTO: 4.64 MILL/MM3 (ref 4.2–5.4)
SODIUM SERPL-SCNC: 141 MEQ/L (ref 135–145)
TRIGL SERPL-MCNC: 93 MG/DL (ref 0–199)
TSH SERPL DL<=0.005 MIU/L-ACNC: 5.99 UIU/ML (ref 0.4–4.2)
WBC # BLD AUTO: 7.4 THOU/MM3 (ref 4.8–10.8)

## 2024-07-13 PROCEDURE — 83036 HEMOGLOBIN GLYCOSYLATED A1C: CPT

## 2024-07-13 PROCEDURE — 80053 COMPREHEN METABOLIC PANEL: CPT

## 2024-07-13 PROCEDURE — 84439 ASSAY OF FREE THYROXINE: CPT

## 2024-07-13 PROCEDURE — 85025 COMPLETE CBC W/AUTO DIFF WBC: CPT

## 2024-07-13 PROCEDURE — 36415 COLL VENOUS BLD VENIPUNCTURE: CPT

## 2024-07-13 PROCEDURE — 80061 LIPID PANEL: CPT

## 2024-07-13 PROCEDURE — 84443 ASSAY THYROID STIM HORMONE: CPT

## 2024-07-14 LAB
DEPRECATED MEAN GLUCOSE BLD GHB EST-ACNC: 105 MG/DL (ref 70–126)
HBA1C MFR BLD HPLC: 5.5 % (ref 4.4–6.4)
T4 FREE SERPL-MCNC: 1.07 NG/DL (ref 0.93–1.68)

## 2024-07-15 ENCOUNTER — OFFICE VISIT (OUTPATIENT)
Dept: FAMILY MEDICINE CLINIC | Age: 64
End: 2024-07-15
Payer: COMMERCIAL

## 2024-07-15 VITALS
RESPIRATION RATE: 18 BRPM | BODY MASS INDEX: 38.8 KG/M2 | HEART RATE: 74 BPM | OXYGEN SATURATION: 99 % | DIASTOLIC BLOOD PRESSURE: 88 MMHG | HEIGHT: 67 IN | WEIGHT: 247.2 LBS | SYSTOLIC BLOOD PRESSURE: 136 MMHG

## 2024-07-15 DIAGNOSIS — Z00.01 ENCOUNTER FOR WELL ADULT EXAM WITH ABNORMAL FINDINGS: Primary | ICD-10-CM

## 2024-07-15 DIAGNOSIS — E03.9 ACQUIRED HYPOTHYROIDISM: ICD-10-CM

## 2024-07-15 DIAGNOSIS — E66.01 SEVERE OBESITY (BMI 35.0-39.9) WITH COMORBIDITY (HCC): ICD-10-CM

## 2024-07-15 DIAGNOSIS — M76.60 ACHILLES TENDON PAIN: ICD-10-CM

## 2024-07-15 DIAGNOSIS — I10 PRIMARY HYPERTENSION: ICD-10-CM

## 2024-07-15 DIAGNOSIS — Z12.31 SCREENING MAMMOGRAM FOR BREAST CANCER: ICD-10-CM

## 2024-07-15 PROCEDURE — 3079F DIAST BP 80-89 MM HG: CPT | Performed by: NURSE PRACTITIONER

## 2024-07-15 PROCEDURE — 3075F SYST BP GE 130 - 139MM HG: CPT | Performed by: NURSE PRACTITIONER

## 2024-07-15 PROCEDURE — 99214 OFFICE O/P EST MOD 30 MIN: CPT | Performed by: NURSE PRACTITIONER

## 2024-07-15 PROCEDURE — 99396 PREV VISIT EST AGE 40-64: CPT | Performed by: NURSE PRACTITIONER

## 2024-07-15 RX ORDER — LEVOTHYROXINE SODIUM 0.12 MG/1
TABLET ORAL
Qty: 90 TABLET | Refills: 3 | Status: SHIPPED | OUTPATIENT
Start: 2024-07-15

## 2024-07-15 SDOH — ECONOMIC STABILITY: FOOD INSECURITY: WITHIN THE PAST 12 MONTHS, THE FOOD YOU BOUGHT JUST DIDN'T LAST AND YOU DIDN'T HAVE MONEY TO GET MORE.: NEVER TRUE

## 2024-07-15 SDOH — ECONOMIC STABILITY: FOOD INSECURITY: WITHIN THE PAST 12 MONTHS, YOU WORRIED THAT YOUR FOOD WOULD RUN OUT BEFORE YOU GOT MONEY TO BUY MORE.: NEVER TRUE

## 2024-07-15 SDOH — ECONOMIC STABILITY: INCOME INSECURITY: HOW HARD IS IT FOR YOU TO PAY FOR THE VERY BASICS LIKE FOOD, HOUSING, MEDICAL CARE, AND HEATING?: NOT HARD AT ALL

## 2024-07-15 ASSESSMENT — ENCOUNTER SYMPTOMS
NAUSEA: 0
SORE THROAT: 0
COUGH: 0
SHORTNESS OF BREATH: 0
EYE PAIN: 0
BACK PAIN: 0
VOMITING: 0
CHEST TIGHTNESS: 0
ABDOMINAL PAIN: 0

## 2024-07-15 ASSESSMENT — PATIENT HEALTH QUESTIONNAIRE - PHQ9
SUM OF ALL RESPONSES TO PHQ QUESTIONS 1-9: 0
2. FEELING DOWN, DEPRESSED OR HOPELESS: NOT AT ALL
SUM OF ALL RESPONSES TO PHQ QUESTIONS 1-9: 0
1. LITTLE INTEREST OR PLEASURE IN DOING THINGS: NOT AT ALL
SUM OF ALL RESPONSES TO PHQ9 QUESTIONS 1 & 2: 0

## 2024-07-15 NOTE — PROGRESS NOTES
Well Adult Note  Name: Nita Mcnamara Today’s Date: 7/15/2024   MRN: [id number] Sex: Female   Age: 64 y.o. Ethnicity: Non- / Non    : 1960 Race: White (non-)      Nita Mcnamara is here for a well adult exam.       Assessment & Plan   Encounter for well adult exam with abnormal findings  Screening mammogram for breast cancer  -     JEANNE PAOLA DIGITAL SCREEN BILATERAL; Future  Achilles tendon pain  -     [facility] [place]  Primary hypertension  Severe obesity (BMI 35.0-39.9) with comorbidity (HCC)  Acquired hypothyroidism  -     levothyroxine (SYNTHROID) 125 MCG tablet; 1 po daily, Disp-90 tablet, R-3Normal      Will continue thyroid medication at current dose.   Will work on diet and exercise. Handout given on DASH diet.  Will refer to PT for tendon pain. Can consider MRI if no improvement. Does not want to see ortho at this time.    Return in 3 months (on 10/15/2024) for 3 mo fu.       Subjective   History:  Patient presents today for annual wellness visit.  Has not changed diet or exercise. 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  Still having intermittent pain in her achilles tendon. Worse if she does a lot of walking or standing. States can also be worse if she sits for extended periods of time.  BP elevated. Does not check BP at home. Wants to work on diet for BP.  Did increase thyroid medication at last visit. Has not noticed any changes. States she didn't feel she was having any issues before the medication was increased.     Lab Results   Component Value Date    WBC 7.4 2024    HGB 13.9 2024    HCT 43.8 2024     2024    CHOL 184 2024    TRIG 93 2024    HDL 51 2024    ALT 28 2024    AST 26 2024     2024    K 4.6 2024     2024    CREATININE 0.8 2024    BUN 14 2024    CO2 27 2024    TSH 5.990 (H) 2024    LABA1C 5.5 2024    T4,

## 2024-07-15 NOTE — PROGRESS NOTES
Well Adult Note  Name: Nita Mcnamara Today’s Date: 7/15/2024   MRN: [id number] Sex: Female   Age: 64 y.o. Ethnicity: Non- / Non    : 1960 Race: White (non-)      Nita Mcnamara is here for a well adult exam.       Subjective   History:  ***    Review of Systems    Allergies   Allergen Reactions    Penicillins Hives     Prior to Visit Medications    Medication Sig Taking? Authorizing Provider   levothyroxine (SYNTHROID) 125 MCG tablet 1 po daily Yes Jeanie Milligan MD     Past Medical History:   Diagnosis Date    Hypothyroidism      No past surgical history on file.  Family History   Problem Relation Age of Onset    Kidney Disease Mother     Heart Disease Mother     Obesity Mother     Anemia Mother     Cancer Father     Diabetes Brother     Heart Disease Brother     Breast Cancer Paternal Grandmother     Prostate Cancer Paternal Grandfather      Social History     Tobacco Use    Smoking status: Never    Smokeless tobacco: Never   Substance Use Topics    Alcohol use: Never    Drug use: Never           Objective   Vital Signs  BP (!) 144/92 (Site: Left Upper Arm, Position: Sitting, Cuff Size: Medium Adult)   Pulse 74   Resp 18   Ht 1.702 m (5' 7\")   Wt 112.1 kg (247 lb 3.2 oz)   SpO2 99%   BMI 38.72 kg/m²     Wt Readings from Last 3 Encounters:   07/15/24 112.1 kg (247 lb 3.2 oz)   23 110.8 kg (244 lb 3.2 oz)   23 110.7 kg (244 lb)       Physical Exam        Assessment & Plan   Encounter for well adult exam without abnormal findings  Screening mammogram for breast cancer  Encounter for well adult exam with abnormal findings          Return in 3 months (on 10/15/2024).     Personalized Preventive Plan  Current Health Maintenance Status  Immunization History   Administered Date(s) Administered    Influenza A ([id number]) Vaccine PF IM 2010    TDaP, ADACEL (age 10y-64y), BOOSTRIX (age 10y+), IM, 0.5mL 09/10/2015    Zoster Live (Zostavax) 10/29/2013, 2014

## 2024-08-01 ENCOUNTER — HOSPITAL ENCOUNTER (OUTPATIENT)
Dept: PHYSICAL THERAPY | Age: 64
Setting detail: THERAPIES SERIES
Discharge: HOME | End: 2024-08-01
Payer: COMMERCIAL

## 2024-08-01 PROCEDURE — 97161 PT EVAL LOW COMPLEX 20 MIN: CPT

## 2024-08-01 NOTE — PROGRESS NOTES
** PLEASE PLEASE SIGN, DATE AND TIME CERTIFICATION BELOW AND RETURN TO [facility] OUTPATIENT REHABILITATION (FAX #: [phone]).  ATTEST/CO-SIGN IF ACCESSING VIA INTruLeaf.  THANK YOU.**    I certify that I have examined the patient below and determined that Physical Medicine and Rehabilitation service is necessary and that I approve the established plan of care for up to 90 days or as specifically noted.  Attestation, signature or co-signature of physician indicates approval of certification requirements.    ________________________ ____________ __________  Physician Signature   Date   Time  [facility]  PHYSICAL THERAPY  [x] EVALUATION  [] DAILY NOTE (LAND) [] DAILY NOTE (AQUATIC ) [] PROGRESS NOTE [] DISCHARGE NOTE    [] OUTPATIENT REHABILITATION CENTER [facility]   [x] [facility]    [] [facility]   [] [facility]    Date: 2024  Patient Name:  Nita Mcnamara   : 1960  MRN: [id number]  CSN: [id number]    Referring Practitioner Amber Freire, APRN - CNP    Diagnosis Achilles tendinitis, unspecified leg   Treatment Diagnosis M79.662  Pain in left lower leg  M25.372 Other instability, left ankle  R53.1 Weakness   Date of Evaluation 24    Additional Pertinent History       Functional Outcome Measure Used LEFS   Functional Outcome Score 46/80 (24)       Insurance: Primary: Payor: [facility] /  /  / ,   Secondary:    Authorization Information: PRE CERTIFICATION REQUIRED: Precert required after initial evaluation. INITIAL EVAL AUTH THRU CAREL[employer]: # [id number] 2024 - 2024     INSURANCE THERAPY BENEFIT: Allowed 40 visits per calendar year.  0 visits have been used.. OT AND OFFICE VISIT COMBINEDHard Max..   AQUATIC THERAPY COVERED: Yes  MODALITIES COVERED:  Yes, NO MASSAGE   Approved Procedure Codes: 82856 - Therapeutic Exercise    99357 - Manual Therapy   28644 - Ultrasound   24508 - Iontophoresis   (Codes requested indicated by red font, codes

## 2024-08-07 ENCOUNTER — HOSPITAL ENCOUNTER (OUTPATIENT)
Dept: PHYSICAL THERAPY | Age: 64
Setting detail: THERAPIES SERIES
Discharge: HOME | End: 2024-08-07
Payer: COMMERCIAL

## 2024-08-07 PROCEDURE — 97110 THERAPEUTIC EXERCISES: CPT

## 2024-08-07 PROCEDURE — 97035 APP MDLTY 1+ULTRASOUND EA 15: CPT

## 2024-08-07 NOTE — PROGRESS NOTES
couple weeks later she started in with knee pain. Low grade pain present all the time but when she is walking, working, etc it is ok but sitting then causes it to tighten up, then takes 15 minutes to loosen it back up. Pt able to go to [place] for 5-6 hours before needing rest. Pt has taken minimal ibuprofen over the past 2 years. Going down stairs is harder than going up. Pt notes left has more swelling than right. Pt used ice and massager when pain started but hasn't lately. Pt occasionally feels tightness in distal quad affecting how far she can bend it.      SUBJECTIVE: Patient reporting pain is 4/10 today.        Objective:    TREATMENT   Precautions:    Pain: 4/10 in achilles    \"X” in shaded column indicates activity completed today    “*\" next to exercise/intervention indicates progression   Modalities Parameters/  Location  Notes   Ultrasound to left Achilles with pt side lying 10 min 50% 1.0mhz, 1.0w/cm2 x                Manual Therapy Time/Technique  Notes                     Exercise/Intervention   Notes   Ankle PF/DF   x    Ankle circles   x    Ankle alphabet   x    Calf stretch (knee straight) soleus stretch (knee flexed) 3x20 sec  x    Long sitting HS stretch       Piriformis stretch                     Seated:       Heel/toe raises* 15  x    LAQ with foot flexed* 10  x                  Standing lunges stretch* 3x10 sec  x    Standing calf stretch with foot up on wall* 3x10 sec  x      Specific Interventions Next Treatment: ultrasound to left achilles, dry needling as needed, LLE stretches- may need to add figure 4 piriformis stretch, quad stretch, L knee and ankle strengthening with balance training, stairs especially descending    Activity/Treatment Tolerance:  [x]  Patient tolerated treatment well  []  Patient limited by fatigue  []  Patient limited by pain   []  Patient limited by medical complications  []  Other:     Assessment: Made progressions with patient having noted tightness. Also

## 2024-08-08 ENCOUNTER — HOSPITAL ENCOUNTER (OUTPATIENT)
Dept: PHYSICAL THERAPY | Age: 64
Setting detail: THERAPIES SERIES
Discharge: HOME | End: 2024-08-08
Payer: COMMERCIAL

## 2024-08-08 PROCEDURE — 97035 APP MDLTY 1+ULTRASOUND EA 15: CPT

## 2024-08-08 PROCEDURE — 97110 THERAPEUTIC EXERCISES: CPT

## 2024-08-08 NOTE — PROGRESS NOTES
couple weeks later she started in with knee pain. Low grade pain present all the time but when she is walking, working, etc it is ok but sitting then causes it to tighten up, then takes 15 minutes to loosen it back up. Pt able to go to [place] for 5-6 hours before needing rest. Pt has taken minimal ibuprofen over the past 2 years. Going down stairs is harder than going up. Pt notes left has more swelling than right. Pt used ice and massager when pain started but hasn't lately. Pt occasionally feels tightness in distal quad affecting how far she can bend it.      SUBJECTIVE: Patient reporting pain is 3/10 and stated that she felt good following last session but tightness came back. Had some all over general soreness.        Objective:    TREATMENT   Precautions:    Pain: 4/10 in achilles    \"X” in shaded column indicates activity completed today    “*\" next to exercise/intervention indicates progression   Modalities Parameters/  Location  Notes   Ultrasound to left Achilles with pt side lying 10 min 50% 1.0mhz, 1.0w/cm2 x                Manual Therapy Time/Technique  Notes                     Exercise/Intervention   Notes   Ankle PF/DF 10  x    Ankle circles 10  x    Ankle alphabet 1x  x    Calf stretch (knee straight) soleus stretch (knee flexed) 3x20 sec  x    Long sitting HS stretch 3x20  x    Piriformis stretch   x    Quad set with bolster under ankle foot flexed 10x5 sec  x    SLR* 10x5 sec  x    Hip adduction with ball 10x5 sec  x    Seated:       Heel/toe raises* 15  x    LAQ with foot flexed 10  x                         Standing calf stretch with foot up on wall 3x10 sec  x           Total gym squats*  15  x      Specific Interventions Next Treatment: ultrasound to left achilles, dry needling as needed, LLE stretches- may need to add figure 4 piriformis stretch, quad stretch, L knee and ankle strengthening with balance training, stairs especially descending    Activity/Treatment Tolerance:  [x]  Patient

## 2024-08-14 ENCOUNTER — HOSPITAL ENCOUNTER (OUTPATIENT)
Dept: PHYSICAL THERAPY | Age: 64
Setting detail: THERAPIES SERIES
Discharge: HOME | End: 2024-08-14
Payer: COMMERCIAL

## 2024-08-14 PROCEDURE — 97035 APP MDLTY 1+ULTRASOUND EA 15: CPT

## 2024-08-14 PROCEDURE — 97110 THERAPEUTIC EXERCISES: CPT

## 2024-08-14 NOTE — PROGRESS NOTES
stretch, quad stretch, L knee and ankle strengthening with balance training, stairs especially descending    Activity/Treatment Tolerance:  [x]  Patient tolerated treatment well  []  Patient limited by fatigue  []  Patient limited by pain   []  Patient limited by medical complications  []  Other:     Assessment: Pt reports 0/10 pain at end of session.  Continued with POC, introduced bike this date with good tolerance. After US, pt reporting pain demolished to 0/10. Pt to cont with HEP, monitor response to session.    GOALS:  Patient Goal: Try to get rid of achilles tenderness and knot to be able to walk long distances again, and spent a day at [place]    Short Term Goals: 4 weeks  Patient will report minimal tightness in knee and ankle at end range ROM for ease squatting.   Patient will be able to perform 5 single limb heel raises on left to equal height as right to improve strength for stairs.   Patient will be able to perform left SLS x15 seconds with minimal sway to tolerate walking on uneven surfaces.   Patient will be able to sit for 1 hour with <3/10 pain and no need to move leg around for traveling.     Long Term Goals: 8 weeks  Patient will be able to perform 10 single limb heel raises without pain to improve strength to walk longer distances.   Patient will perform left single limb stance x30 seconds to tolerate walking on uneven terrain.  Patient will be independent and compliant with HEP to achieve above goals.       Patient Education:   []  HEP/Education Completed: quad sets, SAQ and SLR hand outs given.  [employer] Access Code: [email or web address]   []  No new Education completed  [x]  Reviewed Prior HEP      [x]  Patient verbalized and/or demonstrated understanding of education provided.  []  Patient unable to verbalize and/or demonstrate understanding of education provided.  Will continue education.  []  Barriers to learning:     PLAN:  Treatment Recommendations: Strengthening, Range of Motion, Balance

## 2024-08-15 ENCOUNTER — HOSPITAL ENCOUNTER (OUTPATIENT)
Dept: PHYSICAL THERAPY | Age: 64
Setting detail: THERAPIES SERIES
Discharge: HOME | End: 2024-08-15
Payer: COMMERCIAL

## 2024-08-15 PROCEDURE — 97110 THERAPEUTIC EXERCISES: CPT

## 2024-08-15 PROCEDURE — 97035 APP MDLTY 1+ULTRASOUND EA 15: CPT

## 2024-08-15 NOTE — PROGRESS NOTES
Patient tolerated treatment well  []  Patient limited by fatigue  []  Patient limited by pain   []  Patient limited by medical complications  []  Other:     Assessment:Added reps as noted with good tolerance. Did switch from bike to [employer] per pt request. No complains at end of session.    GOALS:  Patient Goal: Try to get rid of achilles tenderness and knot to be able to walk long distances again, and spent a day at [place]    Short Term Goals: 4 weeks  Patient will report minimal tightness in knee and ankle at end range ROM for ease squatting.   Patient will be able to perform 5 single limb heel raises on left to equal height as right to improve strength for stairs.   Patient will be able to perform left SLS x15 seconds with minimal sway to tolerate walking on uneven surfaces.   Patient will be able to sit for 1 hour with <3/10 pain and no need to move leg around for traveling.     Long Term Goals: 8 weeks  Patient will be able to perform 10 single limb heel raises without pain to improve strength to walk longer distances.   Patient will perform left single limb stance x30 seconds to tolerate walking on uneven terrain.  Patient will be independent and compliant with HEP to achieve above goals.       Patient Education:   [x]  HEP/Education Completed: increase reps to HEP  [employer] Access Code: [email or web address]   []  No new Education completed  [x]  Reviewed Prior HEP      [x]  Patient verbalized and/or demonstrated understanding of education provided.  []  Patient unable to verbalize and/or demonstrate understanding of education provided.  Will continue education.  []  Barriers to learning:     PLAN:  Treatment Recommendations: Strengthening, Range of Motion, Balance Training, Stair Training, Home Exercise Program, Patient Education, Integrative Dry Needling, and Modalities    []  Plan of care initiated.  Plan to see patient 2 times per week for 8 weeks to address the treatment planned outlined above.  [x]  Continue

## 2024-08-21 ENCOUNTER — HOSPITAL ENCOUNTER (OUTPATIENT)
Dept: PHYSICAL THERAPY | Age: 64
Setting detail: THERAPIES SERIES
Discharge: HOME | End: 2024-08-21
Payer: COMMERCIAL

## 2024-08-21 PROCEDURE — 97110 THERAPEUTIC EXERCISES: CPT

## 2024-08-21 PROCEDURE — 97035 APP MDLTY 1+ULTRASOUND EA 15: CPT

## 2024-08-21 NOTE — PROGRESS NOTES
Modalities    []  Plan of care initiated.  Plan to see patient 2 times per week for 8 weeks to address the treatment planned outlined above.  [x]  Continue with current plan of care  []  Modify plan of care as follows:    []  Hold pending physician visit  []  Discharge    Time In 1611   Time Out 1654   Timed Code Minutes:  43 min   Total Treatment Time: 43 min     Electronically Signed by: Ana Danielle, PTA

## 2024-08-26 ENCOUNTER — HOSPITAL ENCOUNTER (OUTPATIENT)
Dept: PHYSICAL THERAPY | Age: 64
Setting detail: THERAPIES SERIES
Discharge: HOME | End: 2024-08-26
Payer: COMMERCIAL

## 2024-08-26 PROCEDURE — 97035 APP MDLTY 1+ULTRASOUND EA 15: CPT

## 2024-08-26 PROCEDURE — 97110 THERAPEUTIC EXERCISES: CPT

## 2024-08-26 NOTE — DISCHARGE SUMMARY
[facility]  PHYSICAL THERAPY  [] EVALUATION  [] DAILY NOTE (LAND) [] DAILY NOTE (AQUATIC ) [] PROGRESS NOTE [x] DISCHARGE NOTE    [] OUTPATIENT REHABILITATION CENTER - [place]   [x] [place] [facility]    [] [facility]   [] [facility]    Date: 2024  Patient Name:  Nita Mcnamara   : 1960  MRN: [id number]  CSN: [id number]    Referring Practitioner Amber Freire, APRN - CNP    Diagnosis Achilles tendinitis, unspecified leg  Pain in left lower leg  Weakness  Other instability, left ankle   Treatment Diagnosis M79.662  Pain in left lower leg  M25.372 Other instability, left ankle  R53.1 Weakness   Date of Evaluation 24    Additional Pertinent History  has a past medical history of Hypothyroidism.       Functional Outcome Measure Used LEFS   Functional Outcome Score 46/80 (24) .  Discharge 60/80      Insurance: Primary: Payor: [employer] /  /  / ,   Secondary:    Authorization Information: PRE CERTIFICATION REQUIRED: Precert required after initial evaluation. INITIAL EVAL AUTH THRU CARELON: # [id number] 2024 - 2024     INSURANCE THERAPY BENEFIT: Allowed 40 visits per calendar year.  0 visits have been used.. OT AND OFFICE VISIT COMBINED Hard Max..   AQUATIC THERAPY COVERED: Yes  MODALITIES COVERED:  Yes, NO MASSAGE   Approved Procedure Codes: 90364 - Therapeutic Exercise    02162 - Manual Therapy   10748 - Ultrasound   44034 - Iontophoresis   (Codes requested indicated by red font, codes approved indicated by black font).    Received Auth for 7 PT visits for CPT codes 51857, 49100 and 00241 from 24 through 10/3/24    Visit # 6, 6/8 for progress note (Reporting Period: 24 to     )   Visits Allowed: 8   Recertification Date: 24   Physician Follow-Up: 10/23/24   Physician Orders:    History of Present Illness: Pt presents with pain from knee down to ankle. 2 years ago she was working hard, climbing ladders and such. Pt was stretching calf

## 2024-10-02 ENCOUNTER — TELEPHONE (OUTPATIENT)
Dept: FAMILY MEDICINE CLINIC | Age: 64
End: 2024-10-02

## 2024-10-02 DIAGNOSIS — M76.60 ACHILLES TENDON PAIN: Primary | ICD-10-CM

## 2024-10-02 NOTE — TELEPHONE ENCOUNTER
Patient was seen for pain LLE and achilles tendinitis  Referred to PT but still in pain    Asking for a referral to [facility]

## 2024-10-23 ENCOUNTER — HOSPITAL ENCOUNTER (OUTPATIENT)
Dept: MAMMOGRAPHY | Age: 64
Discharge: HOME | End: 2024-10-23
Payer: COMMERCIAL

## 2024-10-23 ENCOUNTER — HOSPITAL ENCOUNTER (OUTPATIENT)
Dept: WOMENS IMAGING | Age: 64
Discharge: HOME | End: 2024-10-23
Attending: RADIOLOGY

## 2024-10-23 ENCOUNTER — OFFICE VISIT (OUTPATIENT)
Dept: FAMILY MEDICINE CLINIC | Age: 64
End: 2024-10-23

## 2024-10-23 VITALS
WEIGHT: 249.6 LBS | HEIGHT: 67 IN | RESPIRATION RATE: 16 BRPM | SYSTOLIC BLOOD PRESSURE: 136 MMHG | HEART RATE: 91 BPM | DIASTOLIC BLOOD PRESSURE: 92 MMHG | BODY MASS INDEX: 39.18 KG/M2 | OXYGEN SATURATION: 98 %

## 2024-10-23 DIAGNOSIS — M76.62 ACHILLES TENDINITIS OF LEFT LOWER EXTREMITY: ICD-10-CM

## 2024-10-23 DIAGNOSIS — E03.9 ACQUIRED HYPOTHYROIDISM: Primary | ICD-10-CM

## 2024-10-23 DIAGNOSIS — L50.9 HIVES: ICD-10-CM

## 2024-10-23 DIAGNOSIS — Z13.1 SCREENING FOR DIABETES MELLITUS: ICD-10-CM

## 2024-10-23 DIAGNOSIS — Z00.6 EXAMINATION FOR NORMAL COMPARISON FOR CLINICAL RESEARCH: ICD-10-CM

## 2024-10-23 DIAGNOSIS — Z13.220 SCREENING, LIPID: ICD-10-CM

## 2024-10-23 DIAGNOSIS — E66.01 SEVERE OBESITY (BMI 35.0-39.9) WITH COMORBIDITY: ICD-10-CM

## 2024-10-23 DIAGNOSIS — Z12.31 SCREENING MAMMOGRAM FOR BREAST CANCER: ICD-10-CM

## 2024-10-23 DIAGNOSIS — E03.9 ACQUIRED HYPOTHYROIDISM: ICD-10-CM

## 2024-10-23 DIAGNOSIS — Z00.00 HEALTHCARE MAINTENANCE: ICD-10-CM

## 2024-10-23 LAB
T4 FREE SERPL-MCNC: 1.07 NG/DL (ref 0.93–1.68)
TSH SERPL DL<=0.005 MIU/L-ACNC: 8.53 UIU/ML (ref 0.4–4.2)

## 2024-10-23 PROCEDURE — 84439 ASSAY OF FREE THYROXINE: CPT

## 2024-10-23 PROCEDURE — 36415 COLL VENOUS BLD VENIPUNCTURE: CPT

## 2024-10-23 PROCEDURE — 77063 BREAST TOMOSYNTHESIS BI: CPT

## 2024-10-23 PROCEDURE — 84443 ASSAY THYROID STIM HORMONE: CPT

## 2024-10-23 RX ORDER — LEVOTHYROXINE SODIUM 150 UG/1
TABLET ORAL
Qty: 90 TABLET | Refills: 3 | Status: SHIPPED | OUTPATIENT
Start: 2024-10-23

## 2024-10-23 ASSESSMENT — ENCOUNTER SYMPTOMS: SHORTNESS OF BREATH: 0

## 2024-10-23 NOTE — RESULT ENCOUNTER NOTE
Please let patient know that her blood work shows worsening of the TSH which indicates worsening hypothyroidism.  She mention fatigue and the hives during the visit.  This may be affected by the thyroid.  I recommend that she do Synthroid 150 mcg daily.  Then in about 8 weeks or so obtaining another thyroid test to see if we are going in the right direction.

## 2024-10-23 NOTE — PROGRESS NOTES
Nita Mcnamara (:  1960) is a 64 y.o. female,Established patient, here for evaluation of the following chief complaint(s):  3 Month Follow-Up      Assessment & Plan   ASSESSMENT/PLAN:  1. Acquired hypothyroidism  -     TSH with Reflex; Future  2. BMI 35.0-39.9  3. Achilles tendinitis of left lower extremity  4. Healthcare maintenance  -     Comprehensive Metabolic Panel; Future  -     CBC with Auto Differential; Future  -     TSH with Reflex; Future  -     Hemoglobin A1C; Future  -     Lipid Panel; Future  5. Screening, lipid  -     Lipid Panel; Future  6. Screening for diabetes mellitus  -     Hemoglobin A1C; Future  7. Hives, intermittent      Assessment & Plan  1. Achilles Tendonitis.  She was advised to continue her stretching exercises, aiming for 75% intensity every other day for three weeks, and then gradually increase to 100%. If she requires new exercises, she should inform the clinic. A consultation with an orthopedist was also recommended. She was encouraged to use well-cushioned and supportive footwear.     2. Hypothyroidism.  Her TSH level on 2024 was 5.9, better than it was previous. Discussed normalizing TSH may improve fatigue, potentially improve weight. A thyroid panel will be conducted today. She is currently on a 125 microgram dose of Synthroid. If she feels symptoms of hypothyroidism, such as fatigue, constipation, or hair thinning, she may need to increase the dosage. Potential side effects of increasing the dosage, including palpitations, anxiety, and insomnia, were discussed.    3. Health Maintenance.  Her cholesterol level was 184, HDL was 51, and A1c was within the normal range. Her glucose levels have consistently remained under 100 for several years. Her creatinine and blood urea nitrogen levels were also within the normal range. She will have a mammogram today. A colonoscopy will be scheduled at a later date. Health maintenance labs will be conducted prior to her next

## 2024-10-24 ENCOUNTER — TELEPHONE (OUTPATIENT)
Dept: FAMILY MEDICINE CLINIC | Age: 64
End: 2024-10-24

## 2024-10-24 NOTE — TELEPHONE ENCOUNTER
----- Message from Dr. Jeanie Milligan MD sent at 10/23/2024  5:19 PM EDT -----  Please let patient know that her blood work shows worsening of the TSH which indicates worsening hypothyroidism.  She mention fatigue and the hives during the visit.  This may be affected by the thyroid.  I recommend that she do Synthroid 150 mcg daily.  Then in about 8 weeks or so obtaining another thyroid test to see if we are going in the right direction.

## 2024-11-08 ENCOUNTER — TELEPHONE (OUTPATIENT)
Dept: FAMILY MEDICINE CLINIC | Age: 64
End: 2024-11-08

## 2024-11-08 NOTE — TELEPHONE ENCOUNTER
----- Message from Dr. Paul Newell MD sent at 11/7/2024  3:55 PM EST -----  Good.    Dr. Newell  Covering for Amber

## 2025-07-21 ENCOUNTER — TELEPHONE (OUTPATIENT)
Dept: FAMILY MEDICINE CLINIC | Age: 65
End: 2025-07-21

## 2025-07-21 NOTE — TELEPHONE ENCOUNTER
----- Message from Adonis MAURICIO sent at 7/21/2025  9:34 AM EDT -----  Regarding: [facility] Appointment Request  [facility] Appointment Request    Patient needs appointment for [facility] Appointment Type: Existing Condition Follow Up.    Patient Requested Dates(s): 8/6/2025  Patient Requested Time: 4:00 PM   Provider Name:   Jeanie Milligan MD     Reason for Appointment Request: Established Patient - Available appointments did not meet patient need, pt supposed to have an appt this Wednesday 7/23/2025 with Jeanie Milligan MD however she wont be able to make it due to work, pt would like to reschedule instead.  --------------------------------------------------------------------------------------------------------------------------    Relationship to Patient: Self     Call Back Information: OK to leave message on voicemail  Preferred Call Back Number: Phone  [phone]

## 2025-07-22 NOTE — TELEPHONE ENCOUNTER
Patient called back and spoke with the  this morning and cancelled her appointment. She did not reschedule at this time.